# Patient Record
Sex: MALE | URBAN - METROPOLITAN AREA
[De-identification: names, ages, dates, MRNs, and addresses within clinical notes are randomized per-mention and may not be internally consistent; named-entity substitution may affect disease eponyms.]

---

## 2017-08-25 ENCOUNTER — IMPORTED ENCOUNTER (OUTPATIENT)
Dept: URBAN - METROPOLITAN AREA CLINIC 1 | Facility: CLINIC | Age: 27
End: 2017-08-25

## 2017-08-25 PROBLEM — H52.13: Noted: 2017-08-25

## 2017-08-25 PROCEDURE — S0620 ROUTINE OPHTHALMOLOGICAL EXA: HCPCS

## 2017-08-25 NOTE — PATIENT DISCUSSION
1. Myopia: Rx was given for correction if indicated and requested. 2. Return for an appointment in 1 year for 40. with Dr. Leena Phelps.

## 2019-02-06 ENCOUNTER — IMPORTED ENCOUNTER (OUTPATIENT)
Dept: URBAN - METROPOLITAN AREA CLINIC 1 | Facility: CLINIC | Age: 29
End: 2019-02-06

## 2019-02-06 PROBLEM — H52.13: Noted: 2019-02-06

## 2019-02-06 PROCEDURE — S0621 ROUTINE OPHTHALMOLOGICAL EXA: HCPCS

## 2019-02-06 NOTE — PATIENT DISCUSSION
1. Myopia: Rx was given for correction if indicated and requested. CTL trials givenReturn for an appointment in I&R with [facility].Return for an appointment in 1 week cc after I&R with Dr. Molly Bajwa.

## 2019-02-06 NOTE — PATIENT DISCUSSION
CTL trials givenReturn for an appointment in I&R with [facility].Return for an appointment in 1 week cc after I&R with Dr. Chuy Allen.

## 2019-02-27 ENCOUNTER — HOSPITAL ENCOUNTER (OUTPATIENT)
Dept: LAB | Age: 29
Discharge: HOME | End: 2019-02-27
Payer: COMMERCIAL

## 2019-02-27 ENCOUNTER — OFFICE VISIT (OUTPATIENT)
Dept: INTERNAL MEDICINE CLINIC | Age: 29
End: 2019-02-27

## 2019-02-27 VITALS
SYSTOLIC BLOOD PRESSURE: 110 MMHG | DIASTOLIC BLOOD PRESSURE: 71 MMHG | WEIGHT: 198 LBS | OXYGEN SATURATION: 98 % | HEIGHT: 72 IN | RESPIRATION RATE: 18 BRPM | HEART RATE: 67 BPM | TEMPERATURE: 98 F | BODY MASS INDEX: 26.82 KG/M2

## 2019-02-27 DIAGNOSIS — F32.A DEPRESSION, UNSPECIFIED DEPRESSION TYPE: ICD-10-CM

## 2019-02-27 DIAGNOSIS — F90.9 ATTENTION DEFICIT HYPERACTIVITY DISORDER (ADHD), UNSPECIFIED ADHD TYPE: Primary | ICD-10-CM

## 2019-02-27 LAB — TSH SERPL DL<=0.05 MIU/L-ACNC: 1.2 UIU/ML (ref 0.36–3.74)

## 2019-02-27 PROCEDURE — 84443 ASSAY THYROID STIM HORMONE: CPT

## 2019-02-27 PROCEDURE — 36415 COLL VENOUS BLD VENIPUNCTURE: CPT

## 2019-02-27 RX ORDER — BUPROPION HYDROCHLORIDE 150 MG/1
150 TABLET ORAL
Qty: 90 TAB | Refills: 0 | Status: SHIPPED | OUTPATIENT
Start: 2019-02-27 | End: 2019-05-09 | Stop reason: DRUGHIGH

## 2019-02-27 RX ORDER — DEXTROAMPHETAMINE SACCHARATE, AMPHETAMINE ASPARTATE MONOHYDRATE, DEXTROAMPHETAMINE SULFATE AND AMPHETAMINE SULFATE 5; 5; 5; 5 MG/1; MG/1; MG/1; MG/1
CAPSULE, EXTENDED RELEASE ORAL
COMMUNITY
Start: 2019-01-19 | End: 2019-03-07

## 2019-02-27 NOTE — PROGRESS NOTES
FAMILY MEDICINE CLINIC NOTE 
 
S: The patient presents for establishment of care. He has a medical history significant for depression and ADHD. He is interested in referral to psychology for psychotherapy for severe depression. Previously taking zoloft and adderall but stooped secondary to SI. He has had no withdrawal symptoms. He would also like referral for marriage counseling. No recent SI/HI History of testicular cancer with left orchiectomy in 2011 Current Outpatient Medications on File Prior to Visit Medication Sig Dispense Refill  amphetamine-dextroamphetamine XR (ADDERALL XR) 20 mg XR capsule No current facility-administered medications on file prior to visit. Past Medical History:  
Diagnosis Date  Testicular cancer ([facility] [address] Social History Socioeconomic History  Marital status:  Spouse name: Not on file  Number of children: Not on file  Years of education: Not on file  Highest education level: Not on file Social Needs  Financial resource strain: Not on file  Food insecurity - worry: Not on file  Food insecurity - inability: Not on file  Transportation needs - medical: Not on file  Transportation needs - non-medical: Not on file Occupational History  Not on file Tobacco Use  Smoking status: Not on file Substance and Sexual Activity  Alcohol use: Not on file  Drug use: Not on file  Sexual activity: Not on file Other Topics Concern  Not on file Social History Narrative  Not on file History reviewed. No pertinent family history. O: 
Visit Vitals /71 (BP 1 Location: Right arm, BP Patient Position: Sitting) Pulse 67 Temp 98 °F (36.7 °C) (Oral) Resp 18 Ht 6' (1.829 m) Wt 198 lb (89.8 kg) SpO2 98% BMI 26.85 kg/m² NAD, comfortable No thyromegaly No LAD 
RRR, no murmurs CTABL, no wheezing/ronchi/rales 
abd soft ND NT normoactive BS No edema 
 
25 y.o. male ICD-10-CM ICD-9-CM 1. Attention deficit hyperactivity disorder (ADHD), unspecified ADHD type F90.9 314.01 buPROPion XL (WELLBUTRIN XL) 150 mg tablet 2. Depression, unspecified depression type F32.9 311 REFERRAL TO PSYCHOLOGY  
   [facility] 3RD GENERATION Follow up in 6 weeks

## 2019-02-27 NOTE — PROGRESS NOTES
ROOM # 10 Edward Nance presents today for Chief Complaint Patient presents with Vicky Saunders [facility] preferred language for health care discussion is english/other. Is someone accompanying this pt? no 
 
Is the patient using any DME equipment during [address]? no 
 
Depression Screening: 
3 most recent PHQ Screens 2/27/2019 Little interest or pleasure in doing things Not at all Feeling down, depressed, irritable, or hopeless More than half the days Total Score PHQ 2 2 Learning Assessment: 
Learning Assessment 2/27/2019 PRIMARY LEARNER Patient HIGHEST LEVEL OF EDUCATION - PRIMARY LEARNER  4 YEARS OF COLLEGE  
BARRIERS PRIMARY LEARNER NONE PRIMARY LANGUAGE ENGLISH  
LEARNER PREFERENCE PRIMARY READING  
ANSWERED BY patient RELATIONSHIP SELF Abuse Screening: 
Abuse Screening Questionnaire 2/27/2019 Do you ever feel afraid of your partner? Christobal Calvin Are you in a relationship with someone who physically or mentally threatens you? Christobal Calvin Is it safe for you to go home? Tori Pineda Fall Risk No flowsheet data found. Health Maintenance reviewed and discussed per provider. Yes Edward Nance is due for Health Maintenance Due Topic Date Due  
 DTaP/Tdap/Td series (1 - Tdap) 07/29/2009  Influenza Age 5 to Adult  08/01/2018 Please order/place referral if appropriate. Advance Directive: 1. Do you have an advance directive in place? Patient Reply: no 
 
2. If not, would you like material regarding how to put one in place? Patient Reply: no 
 
Coordination of Care: 1. Have you been to the ER, urgent care clinic since your last visit? Hospitalized since your last visit? no 
 
2. Have you seen or consulted any other health care providers outside of the [address] [place] [address] since your last visit? Include any pap smears or colon screening. Dr. Alessandro Hernadez office

## 2019-03-06 ENCOUNTER — IMPORTED ENCOUNTER (OUTPATIENT)
Dept: URBAN - METROPOLITAN AREA CLINIC 1 | Facility: CLINIC | Age: 29
End: 2019-03-06

## 2019-03-06 NOTE — PATIENT DISCUSSION
1. CC Today OU -- Good Fit & Comfort OU but VA OU did not do well. Patient did not wear CTL into appointment today secondary to being unhappy w/ VA OU in CTL. New Trial CTL were given to patient today. Changes have been made to CTL Rx by [facility] at today's visit. Return for an appointment in 1 WK for a CC OU with Dr. Molly Bajwa.

## 2019-03-11 ENCOUNTER — IMPORTED ENCOUNTER (OUTPATIENT)
Dept: URBAN - METROPOLITAN AREA CLINIC 1 | Facility: CLINIC | Age: 29
End: 2019-03-11

## 2019-03-11 NOTE — PATIENT DISCUSSION
CC today. Good comfort fit and vision. Finalized Daily and monthly CTL Rx today. Trials given for Monthly CTL today. Patient may obtain either finalized CTL Rx after deciding which one is preferred. Return for an appointment in 1 year for a 40/cc with Dr. Gerald Trinh.

## 2019-04-10 ENCOUNTER — DOCUMENTATION ONLY (OUTPATIENT)
Dept: INTERNAL MEDICINE CLINIC | Age: 29
End: 2019-04-10

## 2019-05-09 ENCOUNTER — OFFICE VISIT (OUTPATIENT)
Dept: INTERNAL MEDICINE CLINIC | Age: 29
End: 2019-05-09

## 2019-05-09 VITALS
DIASTOLIC BLOOD PRESSURE: 86 MMHG | WEIGHT: 206 LBS | RESPIRATION RATE: 17 BRPM | TEMPERATURE: 98.8 F | BODY MASS INDEX: 27.9 KG/M2 | SYSTOLIC BLOOD PRESSURE: 128 MMHG | HEART RATE: 66 BPM | OXYGEN SATURATION: 98 % | HEIGHT: 72 IN

## 2019-05-09 DIAGNOSIS — F32.A DEPRESSION, UNSPECIFIED DEPRESSION TYPE: ICD-10-CM

## 2019-05-09 DIAGNOSIS — F90.9 ATTENTION DEFICIT HYPERACTIVITY DISORDER (ADHD), UNSPECIFIED ADHD TYPE: ICD-10-CM

## 2019-05-09 DIAGNOSIS — F41.9 ANXIETY: Primary | ICD-10-CM

## 2019-05-09 RX ORDER — BUPROPION HYDROCHLORIDE 150 MG/1
150 TABLET ORAL
Qty: 90 TAB | Refills: 0 | Status: CANCELLED | OUTPATIENT
Start: 2019-05-09

## 2019-05-09 RX ORDER — BUPROPION HYDROCHLORIDE 300 MG/1
300 TABLET ORAL
Qty: 30 TAB | Refills: 1 | Status: SHIPPED | OUTPATIENT
Start: 2019-05-09

## 2019-05-09 NOTE — PROGRESS NOTES
ROOM # 1  Identified pt with two pt identifiers(name and ). Reviewed record in preparation for visit and have obtained necessary documentation. Chief Complaint   Patient presents with    Establish Care    Anxiety    Depression      Requested Prescriptions     Pending Prescriptions Disp Refills    buPROPion XL (WELLBUTRIN XL) 150 mg tablet 90 Tab 0     Sig: Take 1 Tab by mouth every morning. Daniella Chandler preferred language for health care discussion is english/other. Is the patient using any DME equipment during OV? NO    Brandon Chandler is due for:  Health Maintenance Due   Topic    DTaP/Tdap/Td series (1 - Tdap)     Health Maintenance reviewed and discussed per provider  Please order/place referral if appropriate. Advance Directive:  1. Do you have an advance directive in place? Patient Reply: NO    2. If not, would you like material regarding how to put one in place? NO    Coordination of Care:  1. Have you been to the ER, urgent care clinic since your last visit? Hospitalized since your last visit? NO    2. Have you seen or consulted any other health care providers outside of the [address] [place] [address] since your last visit? Include any pap smears or colon screening. NO    Patient is accompanied by self I have received verbal consent from Daniella Chandler to discuss any/all medical information while they are present in the room.     Learning Assessment:  Learning Assessment 2019   PRIMARY LEARNER Patient   HIGHEST LEVEL OF EDUCATION - PRIMARY LEARNER  4 YEARS OF COLLEGE   BARRIERS PRIMARY LEARNER NONE   PRIMARY LANGUAGE ENGLISH   LEARNER PREFERENCE PRIMARY READING   ANSWERED BY patient   RELATIONSHIP SELF     Depression Screening:  3 most recent PHQ Screens 2019   Little interest or pleasure in doing things More than half the days Not at all   Feeling down, depressed, irritable, or hopeless Nearly every day More than half the days   Total Score PHQ 2 5 2 Abuse Screening:  Abuse Screening Questionnaire 2/27/2019   Do you ever feel afraid of your partner? N   Are you in a relationship with someone who physically or mentally threatens you? N   Is it safe for you to go home?  Y     Fall Risk  n/i

## 2019-05-09 NOTE — PROGRESS NOTES
Chief Complaint   Patient presents with    [facility]    Anxiety    Depression         HPI:     Theresa Verduzco is a 25 y.o.  male with history of anxiety, depression and ADHD here for the above complaint. He has ADHD and on adderall for 1 year. When he was on medication he was fine, but then when stopped he was more irritable. He is seeing a therapist right now. Anxiety/depression: He thought was getting better, but yesterday got worse and today. No suicidal or homicidal ideations. He feels sad and depression and he is eating and sleeping okay. He has some crying at times. His depression is more aggression than sad. He is taking wellbutrin and he is not sure if it is working or not. He took adderall and zoloft in August 2018. Anxiety comes and goes and seeing a marriage counselor. ADHD: He is still having issues with focusing. He said when he is planning stuff at work, but now it is harder to get stuff done. He is having problems focusing. He was on IR adderall and then XR adderall because so fully controlling him. He wants to see a psychiatrist that can take care of everything in regards to ADHD, depression, anxiety and marriage counselor. He said when he was on adderall and zoloft, the zoloft made him feel more suicidal.         Past Medical History:   Diagnosis Date    Testicular cancer [facility])        History reviewed. No pertinent surgical history. MEDICATION ALLERGIES/INTOLERANCES:   No Known Allergies          CURRENT MEDICATIONS:    Current Outpatient Medications   Medication Sig    buPROPion XL (WELLBUTRIN XL) 300 mg XL tablet Take 1 Tab by mouth every morning. No current facility-administered medications for this visit.         Health Maintenance   Topic Date Due    DTaP/Tdap/Td series (1 - Tdap) 07/29/2009    Influenza Age 5 to Adult  08/01/2019    Pneumococcal 0-64 years  Aged Out         FAMILY HISTORY:   Family History   Problem Relation Age of Onset  Depression Mother     Anxiety Mother     No Known Problems Father     No Known Problems Brother        SOCIAL HISTORY:   He  reports that he has quit smoking. He has never used smokeless tobacco.  He  reports that he drinks alcohol. OBJECTIVE:  PHYSICAL EXAM: Vitals:   Vitals:    05/09/19 1425   BP: 128/86   Pulse: 66   Resp: 17   Temp: 98.8 °F (37.1 °C)   TempSrc: Oral   SpO2: 98%   Weight: 206 lb (93.4 kg)   Height: 6' (1.829 m)     Exam:   Generally: Pleasant male in no acute distress    Cardiac exam: regular, rate, and rhythm. No murmurs, gallops, or rubs. Normal S1 and S2.    Pulmonary exam: Clear to ausculation bilaterally    Abdominal exam: Positive bowel sounds in all four quadrants. Soft. Nondistended. Nontender. No hepatosplenomegaly. Extremities: 2+ dorsalis pedis bilaterally. No pedal edema bilaterally. LABS/RADIOLOGICAL TESTS:   none          ASSESSMENT/PLAN:      ICD-10-CM ICD-9-CM    1. Anxiety F41.9 300.00 Will increase the wellbutrin XL to 300mg a day. buPROPion XL (WELLBUTRIN XL) 300 mg XL tablet      REFERRAL TO PSYCHIATRY   2. Attention deficit hyperactivity disorder (ADHD), unspecified ADHD type F90.9 314.01 REFERRAL TO PSYCHIATRY   3. Depression, unspecified depression type F32.9 311 buPROPion XL (WELLBUTRIN XL) 300 mg XL tablet      REFERRAL TO PSYCHIATRY   4.   Requested Prescriptions     Signed Prescriptions Disp Refills    buPROPion XL (WELLBUTRIN XL) 300 mg XL tablet 30 Tab 1     Sig: Take 1 Tab by mouth every morning. 5. Patient verbalized understanding and agreement with the plan. 6. Patient was given an after visit summary. 7.   Follow-up and Dispositions    · Return in about 1 month (around 6/9/2019) for f/u depression & anxiety. or sooner if worsening symptoms.           Sammy Lee MD

## 2019-06-06 ENCOUNTER — OFFICE VISIT (OUTPATIENT)
Dept: INTERNAL MEDICINE CLINIC | Age: 29
End: 2019-06-06

## 2019-06-06 VITALS
BODY MASS INDEX: 27.47 KG/M2 | WEIGHT: 202.8 LBS | DIASTOLIC BLOOD PRESSURE: 75 MMHG | OXYGEN SATURATION: 96 % | HEIGHT: 72 IN | HEART RATE: 75 BPM | TEMPERATURE: 98.7 F | RESPIRATION RATE: 17 BRPM | SYSTOLIC BLOOD PRESSURE: 113 MMHG

## 2019-06-06 DIAGNOSIS — F32.A DEPRESSION, UNSPECIFIED DEPRESSION TYPE: Primary | ICD-10-CM

## 2019-06-06 DIAGNOSIS — F41.9 ANXIETY: ICD-10-CM

## 2019-06-06 NOTE — PROGRESS NOTES
Chief Complaint   Patient presents with    Depression     f/u 1 month     Anxiety     1 month fu        HPI:     Jennifer Boyd is a 25 y.o.  male with history of depression and aniety  here for the above complaint. He feels like the wellbutrin may not be helping. He has no side effects. He denies any chest pain, shortness of breath, abdominal pain, headaches or dizziness. He is not getting more depressed or irritable. However, he can't tell if the wellbutrin is working or not. He was unable to get an appt. With psychiatrist the we referred to so, he talked to his therapist who has a psychiatrist in their practice. He says his anxiety is the same. Mid July has appt. With new psychiatrist.     He also got another marriage counselor as well. Past Medical History:   Diagnosis Date    Testicular cancer [facility])      History reviewed. No pertinent surgical history. Current Outpatient Medications   Medication Sig    buPROPion XL (WELLBUTRIN XL) 300 mg XL tablet Take 1 Tab by mouth every morning. No current facility-administered medications for this visit. Health Maintenance   Topic Date Due    DTaP/Tdap/Td series (1 - Tdap) 07/29/2009    Influenza Age 5 to Adult  08/01/2019    Pneumococcal 0-64 years  Aged Out       There is no immunization history on file for this patient. No LMP for male patient. Allergies and Intolerances:   No Known Allergies    Family History:   Family History   Problem Relation Age of Onset    Depression Mother     Anxiety Mother     No Known Problems Father     No Known Problems Brother        Social History:   He  reports that he has quit smoking. He has never used smokeless tobacco.  He  reports that he drinks alcohol.         ·     Objective:   Physical exam:   Visit Vitals  /75 (BP 1 Location: Right arm, BP Patient Position: Sitting)   Pulse 75   Temp 98.7 °F (37.1 °C) (Oral)   Resp 17   Ht 6' (1.829 m)   Wt 202 lb 12.8 oz (92 kg)   SpO2 96% BMI 27.50 kg/m²        Generally: Pleasant male in no acute distress  Cardiac Exam: regular, rate, and rhythm. Normal S1 and S2. No murmurs, gallops, or rubs  Pulmonary exam: Clear to auscultation bilaterally  Abdominal exam: Positive bowel sounds in all four quadrants, soft, nondistended, nontender  Extremities: 2+ dorsalis pedis pulses bilaterally. No pedal edema    bilaterally    LABS/Radiological Tests:  none    ASSESSMENT/PLAN:    1. Depression, unspecified depression type: seems the same, but pt wants to hold off on increasing the wellbutrin to 450mg a day. He wants to stay with 300mg a day and give it more time. He will f/u with his new psychiatrist in July and see other options. He will also see if having medication for ADHD will help. 2. Anxiety: seems the same, but pt wants to hold off on increasing the wellbutrin to 450mg a day. He wants to stay with 300mg a day and give it more time. He will f/u with his new psychiatrist in July and see other options. 3. Patient verbalized understanding and agreement with the plan. 4. Patient was given an after-visit summary. 5.   Follow-up and Dispositions    Return in about 6 months (around 12/6/2019) for CPE  or sooner if worsening symptoms.   ·                    Jasen Pedraza MD

## 2019-06-06 NOTE — PROGRESS NOTES
ROOM #   Identified pt with two pt identifiers(name and ). Reviewed record in preparation for visit and have obtained necessary documentation. Chief Complaint   Patient presents with    Depression     f/u 1 month     Anxiety     1 month fu       Brandon MascorroIsaias preferred language for health care discussion is english/other. Is the patient using any DME equipment during OV? NO    Brandon Chandler is due for:  Health Maintenance Due   Topic    DTaP/Tdap/Td series (1 - Tdap)     Health Maintenance reviewed and discussed per provider  Please order/place referral if appropriate. Advance Directive:  1. Do you have an advance directive in place? Patient Reply: NO    2. If not, would you like material regarding how to put one in place? NO    Coordination of Care:  1. Have you been to the ER, urgent care clinic since your last visit? Hospitalized since your last visit? NO    2. Have you seen or consulted any other health care providers outside of the [address] [place] [address] since your last visit? Include any pap smears or colon screening. NO    Patient is accompanied by self I have received verbal consent from [facility] to discuss any/all medical information while they are present in the room. Learning Assessment:  Learning Assessment 2019   PRIMARY LEARNER Patient   HIGHEST LEVEL OF EDUCATION - PRIMARY LEARNER  4 YEARS OF COLLEGE   BARRIERS PRIMARY LEARNER NONE   PRIMARY LANGUAGE ENGLISH   LEARNER PREFERENCE PRIMARY READING   ANSWERED BY patient   RELATIONSHIP SELF     Depression Screening:  3 most recent PHQ Screens 2019   Little interest or pleasure in doing things More than half the days Not at all   Feeling down, depressed, irritable, or hopeless Nearly every day More than half the days   Total Score PHQ 2 5 2     Abuse Screening:  Abuse Screening Questionnaire 2019   Do you ever feel afraid of your partner?  N   Are you in a relationship with someone who physically or mentally threatens you? N   Is it safe for you to go home?  Y     Fall Risk  n/i

## 2019-06-17 ENCOUNTER — TELEPHONE (OUTPATIENT)
Dept: INTERNAL MEDICINE CLINIC | Age: 29
End: 2019-06-17

## 2019-06-18 NOTE — TELEPHONE ENCOUNTER
Patient contacted at home number. 2 patient identifiers confirmed. Patient informed of below. Patient states he wants to come off of the Wellbutrin because he has seen no changes in his mood even with the increase of dosage from 150 mg to 300 mg. Patient states his appointment with psych is not until mid July.

## 2019-06-19 NOTE — TELEPHONE ENCOUNTER
We would decrease the dosage to 150mg for 1 week and then off. He would not be able to start a new medication until he is completely off the wellbutrin. Can the 300mg tablets be cut in half? He can use that and if not will send new rx.

## 2019-06-19 NOTE — TELEPHONE ENCOUNTER
Attempted to contact pt at  number, no answer. Lvm for pt to return call to office at [phone] . Will continue to try to contact pt.

## 2019-06-19 NOTE — TELEPHONE ENCOUNTER
Spoke with patient and  2 patient identifiers confirmed. Advised patient of information below. Patient understood and had no further questions.

## 2019-08-01 ENCOUNTER — TELEPHONE (OUTPATIENT)
Dept: INTERNAL MEDICINE CLINIC | Age: 29
End: 2019-08-01

## 2019-08-01 DIAGNOSIS — Z31.69 INFERTILITY COUNSELING: Primary | ICD-10-CM

## 2019-08-05 NOTE — TELEPHONE ENCOUNTER
Attempted to contact patient at  number, no answer. Lvm for patient to return call to office at [phone]. Will continue to try to contact patient.

## 2019-09-03 ENCOUNTER — TELEPHONE (OUTPATIENT)
Dept: INTERNAL MEDICINE CLINIC | Age: 29
End: 2019-09-03

## 2019-09-04 NOTE — TELEPHONE ENCOUNTER
Usually psychiatry should be able to refill psych meds for 90 days until he can find another psychiatrist.

## 2019-09-04 NOTE — TELEPHONE ENCOUNTER
Patient requesting to speak to a nurse about his psychiatrist med's patient would like to know if Dr. Neal Gonzales can refill his med's for a month or 2 patient stated the office he get his med's from will be close by the end of this  this month

## 2019-09-13 NOTE — TELEPHONE ENCOUNTER
Attempted to contact patient at  number, no answer. Lvm for patient to return call to office at [phone]. I have been unable to reach this patient by phone. Encounter will be closed.

## 2022-04-02 ASSESSMENT — KERATOMETRY
OS_K2POWER_DIOPTERS: 47.00
OS_AXISANGLE2_DEGREES: 085
OD_AXISANGLE_DEGREES: 164
OS_AXISANGLE_DEGREES: 175
OS_K1POWER_DIOPTERS: 45.25
OD_K1POWER_DIOPTERS: 45.25
OD_K2POWER_DIOPTERS: 47.75
OD_AXISANGLE2_DEGREES: 074

## 2022-04-02 ASSESSMENT — TONOMETRY
OS_IOP_MMHG: 20
OD_IOP_MMHG: 18
OS_IOP_MMHG: 18
OD_IOP_MMHG: 20

## 2022-04-02 ASSESSMENT — VISUAL ACUITY
OD_SC: 20/20
OD_CC: 20/30
OS_SC: 20/20
OD_CC: J1+
OS_SC: J1+
OS_CC: 20/25
OD_SC: J1+
OS_CC: J1+
OS_CC: J1+
OD_SC: 20/20
OS_SC: 20/20
OD_CC: J1+